# Patient Record
Sex: FEMALE | Race: BLACK OR AFRICAN AMERICAN | NOT HISPANIC OR LATINO | URBAN - METROPOLITAN AREA
[De-identification: names, ages, dates, MRNs, and addresses within clinical notes are randomized per-mention and may not be internally consistent; named-entity substitution may affect disease eponyms.]

---

## 2024-02-12 ENCOUNTER — LAB (OUTPATIENT)
Dept: URBAN - METROPOLITAN AREA CLINIC 105 | Facility: CLINIC | Age: 44
End: 2024-02-12

## 2024-02-12 ENCOUNTER — OV NP (OUTPATIENT)
Dept: URBAN - METROPOLITAN AREA CLINIC 105 | Facility: CLINIC | Age: 44
End: 2024-02-12
Payer: COMMERCIAL

## 2024-02-12 VITALS
BODY MASS INDEX: 30.78 KG/M2 | TEMPERATURE: 97.4 F | WEIGHT: 163 LBS | SYSTOLIC BLOOD PRESSURE: 119 MMHG | HEART RATE: 89 BPM | DIASTOLIC BLOOD PRESSURE: 75 MMHG | HEIGHT: 61 IN

## 2024-02-12 DIAGNOSIS — K21.9 GASTROESOPHAGEAL REFLUX DISEASE, UNSPECIFIED WHETHER ESOPHAGITIS PRESENT: ICD-10-CM

## 2024-02-12 DIAGNOSIS — K92.1 BLACK STOOLS: ICD-10-CM

## 2024-02-12 DIAGNOSIS — R11.0 NAUSEA: ICD-10-CM

## 2024-02-12 DIAGNOSIS — R10.12 LUQ PAIN: ICD-10-CM

## 2024-02-12 PROBLEM — 235595009: Status: ACTIVE | Noted: 2024-02-12

## 2024-02-12 PROCEDURE — 99204 OFFICE O/P NEW MOD 45 MIN: CPT | Performed by: INTERNAL MEDICINE

## 2024-02-12 RX ORDER — BROMOCRIPTINE MESYLATE 2.5 MG/1
TABLET ORAL
Qty: 90 TABLET | Status: ACTIVE | COMMUNITY

## 2024-02-12 RX ORDER — LOSARTAN POTASSIUM AND HYDROCHLOROTHIAZIDE 50; 12.5 MG/1; MG/1
TABLET, FILM COATED ORAL
Qty: 90 TABLET | Status: ACTIVE | COMMUNITY

## 2024-02-12 RX ORDER — PANTOPRAZOLE SODIUM 40 MG/1
1 TABLET TABLET, DELAYED RELEASE ORAL ONCE A DAY
Qty: 60 TABLET | Refills: 0 | OUTPATIENT
Start: 2024-02-12

## 2024-02-12 RX ORDER — NYSTATIN 100000 [USP'U]/ML
SUSPENSION ORAL
Qty: 120 MILLILITER | Status: ACTIVE | COMMUNITY

## 2024-02-12 RX ORDER — DICYCLOMINE HYDROCHLORIDE 20 MG/1
TABLET ORAL
Qty: 20 TABLET | Status: ACTIVE | COMMUNITY

## 2024-02-12 RX ORDER — IBUPROFEN 600 MG/1
TABLET ORAL
Qty: 21 EACH | Refills: 0 | Status: ACTIVE | COMMUNITY

## 2024-02-12 RX ORDER — CIPROFLOXACIN HYDROCHLORIDE 500 MG/1
TABLET, FILM COATED ORAL
Qty: 14 TABLET | Status: ACTIVE | COMMUNITY

## 2024-02-12 RX ORDER — CEFDINIR 300 MG/1
CAPSULE ORAL
Qty: 20 EACH | Refills: 0 | Status: ACTIVE | COMMUNITY

## 2024-02-12 RX ORDER — NYSTATIN 100000 [USP'U]/ML
SUSPENSION ORAL
Qty: 120 MILLILITER | Refills: 0 | Status: ACTIVE | COMMUNITY

## 2024-02-12 RX ORDER — FLUCONAZOLE 150 MG/1
TABLET ORAL
Qty: 1 TABLET | Status: ACTIVE | COMMUNITY

## 2024-02-12 RX ORDER — SEMAGLUTIDE 0.68 MG/ML
INJECTION, SOLUTION SUBCUTANEOUS
Qty: 6 MILLILITER | Status: ACTIVE | COMMUNITY

## 2024-02-12 RX ORDER — ONDANSETRON 4 MG/1
DISSOLVE 1 TABLET ON THE TONGUE THREE TIMES DAILY AS NEEDED FOR NAUSEA TABLET, ORALLY DISINTEGRATING ORAL
Qty: 15 EACH | Refills: 0 | Status: ACTIVE | COMMUNITY

## 2024-02-12 RX ORDER — SULFAMETHOXAZOLE AND TRIMETHOPRIM 800; 160 MG/1; MG/1
TAKE 1 TABLET BY MOUTH TWICE DAILY TABLET ORAL
Qty: 14 EACH | Refills: 0 | Status: ACTIVE | COMMUNITY

## 2024-02-12 RX ORDER — VALACYCLOVIR HYDROCHLORIDE 500 MG/1
TABLET, FILM COATED ORAL
Qty: 90 TABLET | Status: ACTIVE | COMMUNITY

## 2024-02-12 RX ORDER — SULFAMETHOXAZOLE AND TRIMETHOPRIM 800; 160 MG/1; MG/1
TABLET ORAL
Qty: 14 TABLET | Status: ACTIVE | COMMUNITY

## 2024-02-12 RX ORDER — FLASH GLUCOSE SENSOR
KIT MISCELLANEOUS
Qty: 6 EACH | Status: ACTIVE | COMMUNITY

## 2024-02-12 RX ORDER — FLUCONAZOLE 150 MG/1
TAKE 1 TABLET BY MOUTH TODAY. REPEAT IN 72 HOURS TABLET ORAL
Qty: 2 EACH | Refills: 0 | Status: ACTIVE | COMMUNITY

## 2024-02-12 RX ORDER — METFORMIN HYDROCHLORIDE 500 MG/1
TABLET, FILM COATED ORAL
Qty: 180 TABLET | Status: ACTIVE | COMMUNITY

## 2024-02-12 NOTE — PHYSICAL EXAM GASTROINTESTINAL
Abdomen , soft, epigastric and LUQ tenderness, nondistended , no guarding or rigidity , no masses palpable , normal bowel sounds , Liver and Spleen,  no hepatosplenomegaly , liver nontender

## 2024-02-12 NOTE — HPI-TODAY'S VISIT:
New patient 43-year-old female with past medical history of diabetes, Hodgkin's lymphoma S/P chemo/radiation therapy, and HTN here as a post-hospital follow up.  Patient went to [place] ED 02/08/2024 for complaints of diffuse, abdominal pain, left flank pain, nausea, increased urinary frequency, and poor appetite for the past few days. Prior to that, she was seen at an urgent care and was told she had oral thrush and abdominal spasm, she was prescribed medication with no improvement. CMP, 02/08/2024 notable for potassium 3.4, creatinine 1.45 anion gap 13 and GFR 44. CBC 02/08/2024, notable for WBC 3.9, MCV 78.3. UA was positive for UTI.  CT 02/08/2024: Transverse and descending colon decompressed with somewhat shaggy wall thickening. Correlate with G.I. symptoms. Urinary bladder thickening, maybe due to decompression. Correlate with UA for cystitis. Patient was discharged with Omnicef 300 mg BID x 10 days And ibuprofen 600 mg Q6 hours X7 days. Today, patient reports loose, black bowels. No diarrhea. She also reports fecal urgency since last week. Usually she goes every other day with complete evacuation. Denies Pepto Bismol and oral iron use. Denies rectal bleeding. Denies family hx of colon cancer and IBD. She also reports epigastric and LUQ pain that is sharp and intermittent. Admits to nausea, HB and regurgitation. Denies long-term NSAID use. No known food triggers, but she reports a low tolerance to dairy. Denies vomiting. Having SOB and fatigue. No known hx of anemia but reports needing a blood transfusion in 2003 during her chemotherapy.

## 2024-02-19 ENCOUNTER — LAB (OUTPATIENT)
Dept: URBAN - METROPOLITAN AREA CLINIC 4 | Facility: CLINIC | Age: 44
End: 2024-02-19
Payer: COMMERCIAL

## 2024-02-19 ENCOUNTER — COL/EGD (OUTPATIENT)
Dept: URBAN - METROPOLITAN AREA SURGERY CENTER 16 | Facility: SURGERY CENTER | Age: 44
End: 2024-02-19
Payer: COMMERCIAL

## 2024-02-19 DIAGNOSIS — R93.3 ABN FINDINGS-GI TRACT: ICD-10-CM

## 2024-02-19 DIAGNOSIS — K63.89 OTHER SPECIFIED DISEASES OF INTESTINE: ICD-10-CM

## 2024-02-19 DIAGNOSIS — K31.89 OTHER DISEASES OF STOMACH AND DUODENUM: ICD-10-CM

## 2024-02-19 DIAGNOSIS — K21.9 ACID REFLUX: ICD-10-CM

## 2024-02-19 DIAGNOSIS — R10.13 ABDOMINAL DISCOMFORT, EPIGASTRIC: ICD-10-CM

## 2024-02-19 DIAGNOSIS — R11.0 AM NAUSEA: ICD-10-CM

## 2024-02-19 PROCEDURE — 88342 IMHCHEM/IMCYTCHM 1ST ANTB: CPT | Performed by: PATHOLOGY

## 2024-02-19 PROCEDURE — 88305 TISSUE EXAM BY PATHOLOGIST: CPT | Performed by: PATHOLOGY

## 2024-02-19 PROCEDURE — 88313 SPECIAL STAINS GROUP 2: CPT | Performed by: PATHOLOGY

## 2024-02-19 PROCEDURE — 88312 SPECIAL STAINS GROUP 1: CPT | Performed by: PATHOLOGY

## 2024-02-19 PROCEDURE — 45380 COLONOSCOPY AND BIOPSY: CPT | Performed by: INTERNAL MEDICINE

## 2024-02-19 PROCEDURE — 43239 EGD BIOPSY SINGLE/MULTIPLE: CPT | Performed by: INTERNAL MEDICINE

## 2024-02-19 RX ORDER — CIPROFLOXACIN HYDROCHLORIDE 500 MG/1
TABLET, FILM COATED ORAL
Qty: 14 TABLET | Status: ACTIVE | COMMUNITY

## 2024-02-19 RX ORDER — SULFAMETHOXAZOLE AND TRIMETHOPRIM 800; 160 MG/1; MG/1
TAKE 1 TABLET BY MOUTH TWICE DAILY TABLET ORAL
Qty: 14 EACH | Refills: 0 | Status: ACTIVE | COMMUNITY

## 2024-02-19 RX ORDER — NYSTATIN 100000 [USP'U]/ML
SUSPENSION ORAL
Qty: 120 MILLILITER | Refills: 0 | Status: ACTIVE | COMMUNITY

## 2024-02-19 RX ORDER — ONDANSETRON 4 MG/1
DISSOLVE 1 TABLET ON THE TONGUE THREE TIMES DAILY AS NEEDED FOR NAUSEA TABLET, ORALLY DISINTEGRATING ORAL
Qty: 15 EACH | Refills: 0 | Status: ACTIVE | COMMUNITY

## 2024-02-19 RX ORDER — SULFAMETHOXAZOLE AND TRIMETHOPRIM 800; 160 MG/1; MG/1
TABLET ORAL
Qty: 14 TABLET | Status: ACTIVE | COMMUNITY

## 2024-02-19 RX ORDER — VALACYCLOVIR HYDROCHLORIDE 500 MG/1
TABLET, FILM COATED ORAL
Qty: 90 TABLET | Status: ACTIVE | COMMUNITY

## 2024-02-19 RX ORDER — METFORMIN HYDROCHLORIDE 500 MG/1
TABLET, FILM COATED ORAL
Qty: 180 TABLET | Status: ACTIVE | COMMUNITY

## 2024-02-19 RX ORDER — FLASH GLUCOSE SENSOR
KIT MISCELLANEOUS
Qty: 6 EACH | Status: ACTIVE | COMMUNITY

## 2024-02-19 RX ORDER — CEFDINIR 300 MG/1
CAPSULE ORAL
Qty: 20 EACH | Refills: 0 | Status: ACTIVE | COMMUNITY

## 2024-02-19 RX ORDER — FLUCONAZOLE 150 MG/1
TAKE 1 TABLET BY MOUTH TODAY. REPEAT IN 72 HOURS TABLET ORAL
Qty: 2 EACH | Refills: 0 | Status: ACTIVE | COMMUNITY

## 2024-02-19 RX ORDER — IBUPROFEN 600 MG/1
TABLET ORAL
Qty: 21 EACH | Refills: 0 | Status: ACTIVE | COMMUNITY

## 2024-02-19 RX ORDER — DICYCLOMINE HYDROCHLORIDE 20 MG/1
TABLET ORAL
Qty: 20 TABLET | Status: ACTIVE | COMMUNITY

## 2024-02-19 RX ORDER — BROMOCRIPTINE MESYLATE 2.5 MG/1
TABLET ORAL
Qty: 90 TABLET | Status: ACTIVE | COMMUNITY

## 2024-02-19 RX ORDER — LOSARTAN POTASSIUM AND HYDROCHLOROTHIAZIDE 50; 12.5 MG/1; MG/1
TABLET, FILM COATED ORAL
Qty: 90 TABLET | Status: ACTIVE | COMMUNITY

## 2024-02-19 RX ORDER — SEMAGLUTIDE 0.68 MG/ML
INJECTION, SOLUTION SUBCUTANEOUS
Qty: 6 MILLILITER | Status: ACTIVE | COMMUNITY

## 2024-02-19 RX ORDER — PANTOPRAZOLE SODIUM 40 MG/1
TAKE 1 TABLET BY MOUTH EVERY DAY TABLET, DELAYED RELEASE ORAL
Qty: 90 TABLET | Refills: 0 | Status: ACTIVE | COMMUNITY

## 2024-02-19 RX ORDER — NYSTATIN 100000 [USP'U]/ML
SUSPENSION ORAL
Qty: 120 MILLILITER | Status: ACTIVE | COMMUNITY

## 2024-02-19 RX ORDER — FLUCONAZOLE 150 MG/1
TABLET ORAL
Qty: 1 TABLET | Status: ACTIVE | COMMUNITY

## 2024-03-04 ENCOUNTER — OV EP (OUTPATIENT)
Dept: URBAN - METROPOLITAN AREA CLINIC 105 | Facility: CLINIC | Age: 44
End: 2024-03-04
Payer: COMMERCIAL

## 2024-03-04 VITALS
SYSTOLIC BLOOD PRESSURE: 108 MMHG | HEART RATE: 73 BPM | HEIGHT: 61 IN | WEIGHT: 159.2 LBS | DIASTOLIC BLOOD PRESSURE: 75 MMHG | TEMPERATURE: 97.3 F | BODY MASS INDEX: 30.06 KG/M2

## 2024-03-04 DIAGNOSIS — K92.1 BLACK STOOLS: ICD-10-CM

## 2024-03-04 DIAGNOSIS — R10.12 LUQ PAIN: ICD-10-CM

## 2024-03-04 DIAGNOSIS — R71.8 LOW MEAN CORPUSCULAR VOLUME (MCV): ICD-10-CM

## 2024-03-04 DIAGNOSIS — R11.0 NAUSEA: ICD-10-CM

## 2024-03-04 PROBLEM — 197125005: Status: ACTIVE | Noted: 2024-03-04

## 2024-03-04 PROBLEM — 266435005: Status: ACTIVE | Noted: 2024-03-04

## 2024-03-04 PROCEDURE — 99213 OFFICE O/P EST LOW 20 MIN: CPT | Performed by: INTERNAL MEDICINE

## 2024-03-04 RX ORDER — AMITRIPTYLINE HYDROCHLORIDE 10 MG/1
1 TABLET AT BEDTIME TABLET, FILM COATED ORAL ONCE A DAY
Qty: 90 TABLET | Refills: 0 | OUTPATIENT
Start: 2024-03-04

## 2024-03-04 RX ORDER — FLASH GLUCOSE SENSOR
KIT MISCELLANEOUS
Qty: 6 EACH | Status: ACTIVE | COMMUNITY

## 2024-03-04 RX ORDER — BROMOCRIPTINE MESYLATE 2.5 MG/1
TABLET ORAL
Qty: 90 TABLET | Status: ACTIVE | COMMUNITY

## 2024-03-04 RX ORDER — VALACYCLOVIR HYDROCHLORIDE 500 MG/1
TABLET, FILM COATED ORAL
Qty: 90 TABLET | Status: ACTIVE | COMMUNITY

## 2024-03-04 RX ORDER — LOSARTAN POTASSIUM AND HYDROCHLOROTHIAZIDE 50; 12.5 MG/1; MG/1
TABLET, FILM COATED ORAL
Qty: 90 TABLET | Status: ACTIVE | COMMUNITY

## 2024-03-04 RX ORDER — PANTOPRAZOLE SODIUM 40 MG/1
TAKE 1 TABLET BY MOUTH EVERY DAY TABLET, DELAYED RELEASE ORAL
Qty: 90 TABLET | Refills: 0 | Status: ACTIVE | COMMUNITY

## 2024-03-04 RX ORDER — PANTOPRAZOLE SODIUM 40 MG/1
1 TABLET IN THE MORNING ON AN EMPTY STOMACH TABLET, DELAYED RELEASE ORAL ONCE A DAY
Qty: 90 TABLET | Refills: 0 | OUTPATIENT

## 2024-03-04 NOTE — HPI-TODAY'S VISIT:
On 02/12/2024, New patient 43-year-old female with past medical history of diabetes, Hodgkin's lymphoma S/P chemo/radiation therapy, and HTN here as a post-hospital follow up.  Patient went to [place] [facility] 02/08/2024 for complaints of diffuse, abdominal pain, left flank pain, nausea, increased urinary frequency, and poor appetite for the past few days. Prior to that, she was seen at an urgent care and was told she had oral thrush and abdominal spasm, she was prescribed medication with no improvement. CMP, 02/08/2024 notable for potassium 3.4, creatinine 1.45 anion gap 13 and GFR 44. CBC 02/08/2024, notable for WBC 3.9, MCV 78.3. UA was positive for UTI.  CT 02/08/2024: Transverse and descending colon decompressed with somewhat shaggy wall thickening. Correlate with G.I. symptoms. Urinary bladder thickening, maybe due to decompression. Correlate with UA for cystitis. Patient was discharged with Omnicef 300 mg BID x 10 days And ibuprofen 600 mg Q6 hours X7 days. Today, patient reports loose, black bowels. No diarrhea. She also reports fecal urgency since last week. Usually she goes every other day with complete evacuation. Denies Pepto Bismol and oral iron use. Denies rectal bleeding. Denies family hx of colon cancer and IBD. She also reports epigastric and LUQ pain that is sharp and intermittent. Admits to nausea, HB and regurgitation. Denies long-term NSAID use. No known food triggers, but she reports a low tolerance to dairy. Denies vomiting. Having SOB and fatigue. No known hx of anemia but reports needing a blood transfusion in 2003 during her chemotherapy.  Today, pt is here for a follow up. She is s/p EGD/Colon. Procedure and results discussed. All questions answered. She states she has been experiencing gurgling in her stomach since Friday. She is also experiencing some abdominal bloating and abdominal cramping. She is taking Pantoprazole 40 mg at night. She is having BMs every other day. She denies incomplete evacuation. She states it is looser. Denies melena. She has seen urologist. And was told her bladder was normal. Her labs were not resulted to me yet, she states she had them done at [facility]. A medical records request was sent and they sent it back stated they do not have record of the patient visiting the facility.

## 2024-03-25 ENCOUNTER — OV EP (OUTPATIENT)
Dept: URBAN - METROPOLITAN AREA CLINIC 105 | Facility: CLINIC | Age: 44
End: 2024-03-25

## 2024-06-12 ENCOUNTER — ERX REFILL RESPONSE (OUTPATIENT)
Dept: URBAN - METROPOLITAN AREA CLINIC 105 | Facility: CLINIC | Age: 44
End: 2024-06-12

## 2024-06-12 RX ORDER — PANTOPRAZOLE SODIUM 40 MG/1
1 TABLET IN THE MORNING ON AN EMPTY STOMACH TABLET, DELAYED RELEASE ORAL ONCE A DAY
Qty: 90 TABLET | Refills: 0 | OUTPATIENT

## 2024-06-12 RX ORDER — PANTOPRAZOLE 40 MG/1
TAKE 1 TABLET EVERY MORNINGON AN EMPTY STOMACH TABLET, DELAYED RELEASE ORAL
Qty: 90 TABLET | Refills: 0 | OUTPATIENT

## 2024-06-14 ENCOUNTER — OFFICE VISIT (OUTPATIENT)
Dept: URBAN - METROPOLITAN AREA CLINIC 105 | Facility: CLINIC | Age: 44
End: 2024-06-14

## 2024-07-02 ENCOUNTER — OFFICE VISIT (OUTPATIENT)
Dept: URBAN - NONMETROPOLITAN AREA SURGERY CENTER 1 | Facility: SURGERY CENTER | Age: 44
End: 2024-07-02

## 2024-07-12 ENCOUNTER — DASHBOARD ENCOUNTERS (OUTPATIENT)
Age: 44
End: 2024-07-12

## 2024-07-12 ENCOUNTER — OFFICE VISIT (OUTPATIENT)
Dept: URBAN - METROPOLITAN AREA CLINIC 105 | Facility: CLINIC | Age: 44
End: 2024-07-12
Payer: COMMERCIAL

## 2024-07-12 ENCOUNTER — LAB OUTSIDE AN ENCOUNTER (OUTPATIENT)
Dept: URBAN - METROPOLITAN AREA CLINIC 105 | Facility: CLINIC | Age: 44
End: 2024-07-12

## 2024-07-12 VITALS
WEIGHT: 158 LBS | DIASTOLIC BLOOD PRESSURE: 92 MMHG | HEIGHT: 61 IN | SYSTOLIC BLOOD PRESSURE: 147 MMHG | TEMPERATURE: 97.2 F | BODY MASS INDEX: 29.83 KG/M2 | HEART RATE: 92 BPM

## 2024-07-12 DIAGNOSIS — R15.2 FECAL URGENCY: ICD-10-CM

## 2024-07-12 DIAGNOSIS — R10.12 LUQ PAIN: ICD-10-CM

## 2024-07-12 DIAGNOSIS — R11.0 NAUSEA: ICD-10-CM

## 2024-07-12 DIAGNOSIS — R06.02 SOB (SHORTNESS OF BREATH): ICD-10-CM

## 2024-07-12 DIAGNOSIS — K21.9 GASTROESOPHAGEAL REFLUX DISEASE WITHOUT ESOPHAGITIS: ICD-10-CM

## 2024-07-12 DIAGNOSIS — R53.83 FATIGUE, UNSPECIFIED TYPE: ICD-10-CM

## 2024-07-12 DIAGNOSIS — R10.13 EPIGASTRIC ABDOMINAL PAIN: ICD-10-CM

## 2024-07-12 DIAGNOSIS — R71.8 LOW MEAN CORPUSCULAR VOLUME (MCV): ICD-10-CM

## 2024-07-12 DIAGNOSIS — R93.5 ABNORMAL CT OF THE ABDOMEN: ICD-10-CM

## 2024-07-12 DIAGNOSIS — K92.1 BLACK STOOLS: ICD-10-CM

## 2024-07-12 DIAGNOSIS — K58.0 IRRITABLE BOWEL SYNDROME WITH DIARRHEA: ICD-10-CM

## 2024-07-12 PROCEDURE — 99214 OFFICE O/P EST MOD 30 MIN: CPT | Performed by: INTERNAL MEDICINE

## 2024-07-12 RX ORDER — BROMOCRIPTINE MESYLATE 2.5 MG/1
TABLET ORAL
Qty: 90 TABLET | Status: ACTIVE | COMMUNITY

## 2024-07-12 RX ORDER — LOSARTAN POTASSIUM AND HYDROCHLOROTHIAZIDE 50; 12.5 MG/1; MG/1
TABLET, FILM COATED ORAL
Qty: 90 TABLET | Status: ACTIVE | COMMUNITY

## 2024-07-12 RX ORDER — AMITRIPTYLINE HYDROCHLORIDE 10 MG/1
1 TABLET AT BEDTIME TABLET, FILM COATED ORAL ONCE A DAY
Qty: 90 TABLET | Refills: 0 | Status: ACTIVE | COMMUNITY
Start: 2024-03-04

## 2024-07-12 RX ORDER — PANTOPRAZOLE SODIUM 40 MG/1
1 TABLET IN THE MORNING ON AN EMPTY STOMACH TABLET, DELAYED RELEASE ORAL ONCE A DAY
Qty: 90 TABLET | Refills: 0 | OUTPATIENT

## 2024-07-12 RX ORDER — VALACYCLOVIR HYDROCHLORIDE 500 MG/1
TABLET, FILM COATED ORAL
Qty: 90 TABLET | Status: ACTIVE | COMMUNITY

## 2024-07-12 RX ORDER — FLASH GLUCOSE SENSOR
KIT MISCELLANEOUS
Qty: 6 EACH | Status: ACTIVE | COMMUNITY

## 2024-07-12 RX ORDER — PANTOPRAZOLE 40 MG/1
TAKE 1 TABLET EVERY MORNINGON AN EMPTY STOMACH TABLET, DELAYED RELEASE ORAL
Qty: 90 TABLET | Refills: 0 | Status: ACTIVE | COMMUNITY

## 2024-07-12 NOTE — HPI-TODAY'S VISIT:
On 02/12/2024, New patient 43-year-old female with past medical history of diabetes, Hodgkin's lymphoma S/P chemo/radiation therapy, and HTN here as a post-hospital follow up.  Patient went to [place] ED 02/08/2024 for complaints of diffuse, abdominal pain, left flank pain, nausea, increased urinary frequency, and poor appetite for the past few days. Prior to that, she was seen at an urgent care and was told she had oral thrush and abdominal spasm, she was prescribed medication with no improvement. CMP, 02/08/2024 notable for potassium 3.4, creatinine 1.45 anion gap 13 and GFR 44. CBC 02/08/2024, notable for WBC 3.9, MCV 78.3. UA was positive for UTI.  CT 02/08/2024: Transverse and descending colon decompressed with somewhat shaggy wall thickening. Correlate with G.I. symptoms. Urinary bladder thickening, maybe due to decompression. Correlate with UA for cystitis. Patient was discharged with Omnicef 300 mg BID x 10 days And ibuprofen 600 mg Q6 hours X7 days. Today, patient reports loose, black bowels. No diarrhea. She also reports fecal urgency since last week. Usually she goes every other day with complete evacuation. Denies Pepto Bismol and oral iron use. Denies rectal bleeding. Denies family hx of colon cancer and IBD. She also reports epigastric and LUQ pain that is sharp and intermittent. Admits to nausea, HB and regurgitation. Denies long-term NSAID use. No known food triggers, but she reports a low tolerance to dairy. Denies vomiting. Having SOB and fatigue. No known hx of anemia but reports needing a blood transfusion in 2003 during her chemotherapy.  Today, pt is here for a follow up. She is s/p EGD/Colon. Procedure and results discussed. All questions answered. She states she has been experiencing gurgling in her stomach since Friday. She is also experiencing some abdominal bloating and abdominal cramping. She is taking Pantoprazole 40 mg at night. She is having BMs every other day. She denies incomplete evacuation. She states it is looser. Denies melena. She has seen urologist. And was told her bladder was normal. Her labs were not resulted to me yet, she states she had them done at [facility]. A medical records request was sent and they sent it back stated they do not have record of the patient visiting the facility.  7/12/24 Pt of Dr Mckeon/Denisse Seeing me due to scheduling issues Reviewed EGD and colon report from 2/2024 unremarkable except for reflux on esophagus bx.  Has been having stomach discomfort and has to run to the bathroom 'that is why they did the colonoscopy and EGD. they said they didnt see anything" Took Elavil for 90 days and says pain was better but still having episodes of urgency and having to run to the  bathroom. 3-4 times a day on a daily basis. now having nocturnal stools 1-2 ice in a week.  Stools are ALWAYS loose. and sometimes watery.  Taking Pantoprazole for reflux which helps.

## 2024-07-18 ENCOUNTER — LAB OUTSIDE AN ENCOUNTER (OUTPATIENT)
Dept: URBAN - METROPOLITAN AREA CLINIC 105 | Facility: CLINIC | Age: 44
End: 2024-07-18

## 2024-07-23 LAB
ADENOVIRUS F 40/41: NOT DETECTED
CAMPYLOBACTER: NOT DETECTED
CLOSTRIDIUM DIFFICILE: NOT DETECTED
ENTAMOEBA HISTOLYTICA: NOT DETECTED
ENTEROAGGREGATIVE E.COLI: NOT DETECTED
ENTEROTOXIGENIC E.COLI: NOT DETECTED
ESCHERICHIA COLI O157: NOT DETECTED
GIARDIA LAMBLIA: NOT DETECTED
NOROVIRUS GI/GII: NOT DETECTED
ROTAVIRUS A: NOT DETECTED
SALMONELLA SPP.: NOT DETECTED
SHIGA-LIKE TOXIN PRODUCING E.COLI: NOT DETECTED
SHIGELLA SPP. / ENTEROINVASIVE E.COLI: NOT DETECTED
VIBRIO PARAHAEMOLYTICUS: NOT DETECTED
VIBRIO SPP.: NOT DETECTED
YERSINIA ENTEROCOLITICA: NOT DETECTED

## 2024-09-13 ENCOUNTER — OFFICE VISIT (OUTPATIENT)
Dept: URBAN - METROPOLITAN AREA CLINIC 105 | Facility: CLINIC | Age: 44
End: 2024-09-13

## 2024-10-04 ENCOUNTER — OFFICE VISIT (OUTPATIENT)
Dept: URBAN - METROPOLITAN AREA CLINIC 105 | Facility: CLINIC | Age: 44
End: 2024-10-04
Payer: COMMERCIAL

## 2024-10-04 ENCOUNTER — OFFICE VISIT (OUTPATIENT)
Dept: URBAN - METROPOLITAN AREA CLINIC 105 | Facility: CLINIC | Age: 44
End: 2024-10-04

## 2024-10-04 VITALS
WEIGHT: 167 LBS | TEMPERATURE: 97.7 F | DIASTOLIC BLOOD PRESSURE: 78 MMHG | HEIGHT: 61 IN | BODY MASS INDEX: 31.53 KG/M2 | HEART RATE: 68 BPM | SYSTOLIC BLOOD PRESSURE: 119 MMHG

## 2024-10-04 DIAGNOSIS — R10.12 LUQ PAIN: ICD-10-CM

## 2024-10-04 DIAGNOSIS — K92.1 BLACK STOOLS: ICD-10-CM

## 2024-10-04 DIAGNOSIS — K21.9 GASTROESOPHAGEAL REFLUX DISEASE WITHOUT ESOPHAGITIS: ICD-10-CM

## 2024-10-04 DIAGNOSIS — R11.0 NAUSEA: ICD-10-CM

## 2024-10-04 PROCEDURE — 99214 OFFICE O/P EST MOD 30 MIN: CPT | Performed by: INTERNAL MEDICINE

## 2024-10-04 RX ORDER — ESTRADIOL AND NORETHINDRONE ACETATE .5; .1 MG/1; MG/1
1 TABLET TABLET ORAL ONCE A DAY
Status: ACTIVE | COMMUNITY

## 2024-10-04 RX ORDER — PANTOPRAZOLE SODIUM 40 MG/1
1 TABLET IN THE MORNING ON AN EMPTY STOMACH TABLET, DELAYED RELEASE ORAL ONCE A DAY
Qty: 90 TABLET | Refills: 0 | Status: ACTIVE | COMMUNITY

## 2024-10-04 RX ORDER — FLASH GLUCOSE SENSOR
KIT MISCELLANEOUS
Qty: 6 EACH | Status: ACTIVE | COMMUNITY

## 2024-10-04 RX ORDER — AMITRIPTYLINE HYDROCHLORIDE 10 MG/1
1 TABLET AT BEDTIME TABLET, FILM COATED ORAL ONCE A DAY
Qty: 90 TABLET | Refills: 0 | Status: ACTIVE | COMMUNITY
Start: 2024-03-04

## 2024-10-04 RX ORDER — LOSARTAN POTASSIUM AND HYDROCHLOROTHIAZIDE 50; 12.5 MG/1; MG/1
TABLET, FILM COATED ORAL
Qty: 90 TABLET | Status: ACTIVE | COMMUNITY

## 2024-10-04 RX ORDER — PANTOPRAZOLE SODIUM 40 MG/1
1 TABLET IN THE MORNING ON AN EMPTY STOMACH TABLET, DELAYED RELEASE ORAL ONCE A DAY
Qty: 90 TABLET | Refills: 0 | OUTPATIENT

## 2024-10-04 RX ORDER — LINACLOTIDE 145 UG/1
1 CAPSULE AT LEAST 30 MINUTES BEFORE THE FIRST MEAL OF THE DAY ON AN EMPTY STOMACH CAPSULE, GELATIN COATED ORAL ONCE A DAY
Qty: 90 | Refills: 0 | OUTPATIENT
Start: 2024-10-04 | End: 2025-01-01

## 2024-10-04 RX ORDER — VALACYCLOVIR HYDROCHLORIDE 500 MG/1
TABLET, FILM COATED ORAL
Qty: 90 TABLET | Status: ACTIVE | COMMUNITY

## 2024-10-04 RX ORDER — PANTOPRAZOLE 40 MG/1
TAKE 1 TABLET EVERY MORNINGON AN EMPTY STOMACH TABLET, DELAYED RELEASE ORAL
Qty: 90 TABLET | Refills: 0 | Status: ACTIVE | COMMUNITY

## 2024-10-04 RX ORDER — BROMOCRIPTINE MESYLATE 2.5 MG/1
TABLET ORAL
Qty: 90 TABLET | Status: ACTIVE | COMMUNITY

## 2024-10-04 NOTE — HPI-TODAY'S VISIT:
On 02/12/2024, New patient 43-year-old female with past medical history of diabetes, Hodgkin's lymphoma S/P chemo/radiation therapy, and HTN here as a post-hospital follow up.  Patient went to [place] ED 02/08/2024 for complaints of diffuse, abdominal pain, left flank pain, nausea, increased urinary frequency, and poor appetite for the past few days. Prior to that, she was seen at an urgent care and was told she had oral thrush and abdominal spasm, she was prescribed medication with no improvement. CMP, 02/08/2024 notable for potassium 3.4, creatinine 1.45 anion gap 13 and GFR 44. CBC 02/08/2024, notable for WBC 3.9, MCV 78.3. UA was positive for UTI.  CT 02/08/2024: Transverse and descending colon decompressed with somewhat shaggy wall thickening. Correlate with G.I. symptoms. Urinary bladder thickening, maybe due to decompression. Correlate with UA for cystitis. Patient was discharged with Omnicef 300 mg BID x 10 days And ibuprofen 600 mg Q6 hours X7 days. Today, patient reports loose, black bowels. No diarrhea. She also reports fecal urgency since last week. Usually she goes every other day with complete evacuation. Denies Pepto Bismol and oral iron use. Denies rectal bleeding. Denies family hx of colon cancer and IBD. She also reports epigastric and LUQ pain that is sharp and intermittent. Admits to nausea, HB and regurgitation. Denies long-term NSAID use. No known food triggers, but she reports a low tolerance to dairy. Denies vomiting. Having SOB and fatigue. No known hx of anemia but reports needing a blood transfusion in 2003 during her chemotherapy.  Today, pt is here for a follow up. She is s/p EGD/Colon. Procedure and results discussed. All questions answered. She states she has been experiencing gurgling in her stomach since Friday. She is also experiencing some abdominal bloating and abdominal cramping. She is taking Pantoprazole 40 mg at night. She is having BMs every other day. She denies incomplete evacuation. She states it is looser. Denies melena. She has seen urologist. And was told her bladder was normal. Her labs were not resulted to me yet, she states she had them done at [facility]. A medical records request was sent and they sent it back stated they do not have record of the patient visiting the facility.  7/12/24 Pt of Dr Mckeon/Denisse Seeing me due to scheduling issues Reviewed EGD and colon report from 2/2024 unremarkable except for reflux on esophagus bx.  Has been having stomach discomfort and has to run to the bathroom 'that is why they did the colonoscopy and EGD. they said they didnt see anything" Took Elavil for 90 days and says pain was better but still having episodes of urgency and having to run to the  bathroom. 3-4 times a day on a daily basis. now having nocturnal stools 1-2 ice in a week.  Stools are ALWAYS loose. and sometimes watery.  Taking Pantoprazole for reflux which helps.  10/4/24 Wants to switch MDs to me.  Says she will go many days w/o a BM and then the next 3 days "I cant stay off the toilet" SAys yesterday went to [place] and went to the bathroom 5 times afterwards.

## 2024-10-14 ENCOUNTER — WEB ENCOUNTER (OUTPATIENT)
Dept: URBAN - METROPOLITAN AREA CLINIC 105 | Facility: CLINIC | Age: 44
End: 2024-10-14

## 2024-10-14 RX ORDER — PANTOPRAZOLE SODIUM 40 MG/1
1 TABLET IN THE MORNING ON AN EMPTY STOMACH TABLET, DELAYED RELEASE ORAL ONCE A DAY
Qty: 90 | Refills: 3

## 2024-11-01 ENCOUNTER — OFFICE VISIT (OUTPATIENT)
Dept: URBAN - METROPOLITAN AREA CLINIC 105 | Facility: CLINIC | Age: 44
End: 2024-11-01

## 2025-01-03 ENCOUNTER — OFFICE VISIT (OUTPATIENT)
Dept: URBAN - METROPOLITAN AREA CLINIC 105 | Facility: CLINIC | Age: 45
End: 2025-01-03
Payer: COMMERCIAL

## 2025-01-03 VITALS
TEMPERATURE: 97.2 F | DIASTOLIC BLOOD PRESSURE: 90 MMHG | SYSTOLIC BLOOD PRESSURE: 136 MMHG | BODY MASS INDEX: 31.15 KG/M2 | HEART RATE: 103 BPM | WEIGHT: 165 LBS | HEIGHT: 61 IN

## 2025-01-03 DIAGNOSIS — R15.2 FECAL URGENCY: ICD-10-CM

## 2025-01-03 DIAGNOSIS — R06.02 SOB (SHORTNESS OF BREATH): ICD-10-CM

## 2025-01-03 DIAGNOSIS — R71.8 LOW MEAN CORPUSCULAR VOLUME (MCV): ICD-10-CM

## 2025-01-03 DIAGNOSIS — K59.09 CHRONIC CONSTIPATION WITH OVERFLOW: ICD-10-CM

## 2025-01-03 DIAGNOSIS — K21.9 GASTROESOPHAGEAL REFLUX DISEASE WITHOUT ESOPHAGITIS: ICD-10-CM

## 2025-01-03 DIAGNOSIS — R53.83 FATIGUE, UNSPECIFIED TYPE: ICD-10-CM

## 2025-01-03 DIAGNOSIS — K92.1 BLACK STOOLS: ICD-10-CM

## 2025-01-03 DIAGNOSIS — R10.12 LUQ PAIN: ICD-10-CM

## 2025-01-03 DIAGNOSIS — K58.0 IRRITABLE BOWEL SYNDROME WITH DIARRHEA: ICD-10-CM

## 2025-01-03 DIAGNOSIS — R11.0 NAUSEA: ICD-10-CM

## 2025-01-03 DIAGNOSIS — R93.5 ABNORMAL CT OF THE ABDOMEN: ICD-10-CM

## 2025-01-03 PROCEDURE — 99214 OFFICE O/P EST MOD 30 MIN: CPT | Performed by: INTERNAL MEDICINE

## 2025-01-03 RX ORDER — VALACYCLOVIR HYDROCHLORIDE 500 MG/1
TABLET, FILM COATED ORAL
Qty: 90 TABLET | Status: ACTIVE | COMMUNITY

## 2025-01-03 RX ORDER — LOSARTAN POTASSIUM AND HYDROCHLOROTHIAZIDE 50; 12.5 MG/1; MG/1
TAKE 1 TABLET ONCE DAILY TABLET, FILM COATED ORAL
Qty: 90 EACH | Refills: 1 | Status: ACTIVE | COMMUNITY

## 2025-01-03 RX ORDER — LINACLOTIDE 145 UG/1
CAPSULE, GELATIN COATED ORAL
Qty: 90 CAPSULE | Status: ACTIVE | COMMUNITY

## 2025-01-03 RX ORDER — PANTOPRAZOLE SODIUM 40 MG/1
1 TABLET IN THE MORNING ON AN EMPTY STOMACH TABLET, DELAYED RELEASE ORAL ONCE A DAY
Qty: 90 | Refills: 3 | Status: ACTIVE | COMMUNITY

## 2025-01-03 RX ORDER — PANTOPRAZOLE 40 MG/1
TAKE 1 TABLET EVERY MORNINGON AN EMPTY STOMACH TABLET, DELAYED RELEASE ORAL
Qty: 90 TABLET | Refills: 0 | Status: DISCONTINUED | COMMUNITY

## 2025-01-03 RX ORDER — BROMOCRIPTINE MESYLATE 2.5 MG/1
TABLET ORAL
Qty: 90 TABLET | Status: ACTIVE | COMMUNITY

## 2025-01-03 RX ORDER — PANTOPRAZOLE SODIUM 40 MG/1
1 TABLET IN THE MORNING ON AN EMPTY STOMACH TABLET, DELAYED RELEASE ORAL ONCE A DAY
Qty: 90 TABLET | Refills: 0 | OUTPATIENT

## 2025-01-03 RX ORDER — METFORMIN HYDROCHLORIDE 500 MG/1
TAKE 1 TABLET IN THE       MORNING AND 1 TABLET IN THEEVENING WITH MEALS TABLET, FILM COATED ORAL
Qty: 180 EACH | Refills: 0 | Status: ACTIVE | COMMUNITY

## 2025-01-03 RX ORDER — FLASH GLUCOSE SENSOR
KIT MISCELLANEOUS
Qty: 6 EACH | Status: ACTIVE | COMMUNITY

## 2025-01-03 RX ORDER — ESTRADIOL AND NORETHINDRONE ACETATE .5; .1 MG/1; MG/1
1 TABLET TABLET ORAL ONCE A DAY
Status: ACTIVE | COMMUNITY

## 2025-01-03 RX ORDER — AMITRIPTYLINE HYDROCHLORIDE 10 MG/1
1 TABLET AT BEDTIME TABLET, FILM COATED ORAL ONCE A DAY
Qty: 90 TABLET | Refills: 0 | Status: ACTIVE | COMMUNITY
Start: 2024-03-04

## 2025-01-03 RX ORDER — LOSARTAN POTASSIUM AND HYDROCHLOROTHIAZIDE 50; 12.5 MG/1; MG/1
TABLET, FILM COATED ORAL
Qty: 90 TABLET | Status: DISCONTINUED | COMMUNITY

## 2025-01-12 ENCOUNTER — WEB ENCOUNTER (OUTPATIENT)
Dept: URBAN - METROPOLITAN AREA CLINIC 105 | Facility: CLINIC | Age: 45
End: 2025-01-12

## 2025-01-12 RX ORDER — PANTOPRAZOLE SODIUM 40 MG/1
1 TABLET IN THE MORNING ON AN EMPTY STOMACH TABLET, DELAYED RELEASE ORAL ONCE A DAY
Qty: 90 | Refills: 0

## 2025-01-13 ENCOUNTER — ERX REFILL RESPONSE (OUTPATIENT)
Dept: URBAN - METROPOLITAN AREA CLINIC 105 | Facility: CLINIC | Age: 45
End: 2025-01-13

## 2025-01-13 RX ORDER — LINACLOTIDE 145 UG/1
TAKE 1 CAPSULE BY MOUTH DAILY 30 MINUTES BEFORE FIRST MEAL OF THE DAY ON AN EMPTY STOMACH CAPSULE, GELATIN COATED ORAL
Qty: 90 CAPSULE | Refills: 0 | OUTPATIENT

## 2025-01-13 RX ORDER — LINACLOTIDE 145 UG/1
CAPSULE, GELATIN COATED ORAL
Qty: 90 CAPSULE | OUTPATIENT

## 2025-03-04 ENCOUNTER — TELEPHONE ENCOUNTER (OUTPATIENT)
Dept: URBAN - METROPOLITAN AREA CLINIC 6 | Facility: CLINIC | Age: 45
End: 2025-03-04

## 2025-03-04 RX ORDER — LUBIPROSTONE 24 UG/1
1 CAPSULE WITH FOOD AND WATER CAPSULE, GELATIN COATED ORAL TWICE A DAY
Qty: 180 CAPSULE | Refills: 0 | OUTPATIENT
Start: 2025-03-04 | End: 2025-06-02

## 2025-03-31 ENCOUNTER — ERX REFILL RESPONSE (OUTPATIENT)
Dept: URBAN - METROPOLITAN AREA CLINIC 105 | Facility: CLINIC | Age: 45
End: 2025-03-31

## 2025-03-31 RX ORDER — PANTOPRAZOLE 40 MG/1
TAKE 1 TABLET EVERY MORNINGON AN EMPTY STOMACH TABLET, DELAYED RELEASE ORAL
Qty: 90 TABLET | Refills: 0 | OUTPATIENT

## 2025-03-31 RX ORDER — PANTOPRAZOLE SODIUM 40 MG/1
1 TABLET IN THE MORNING ON AN EMPTY STOMACH TABLET, DELAYED RELEASE ORAL ONCE A DAY
Qty: 90 | Refills: 0 | OUTPATIENT

## 2025-04-04 ENCOUNTER — OFFICE VISIT (OUTPATIENT)
Dept: URBAN - METROPOLITAN AREA CLINIC 105 | Facility: CLINIC | Age: 45
End: 2025-04-04

## 2025-04-18 ENCOUNTER — OFFICE VISIT (OUTPATIENT)
Dept: URBAN - METROPOLITAN AREA CLINIC 105 | Facility: CLINIC | Age: 45
End: 2025-04-18
Payer: COMMERCIAL

## 2025-04-18 DIAGNOSIS — K92.1 BLACK STOOLS: ICD-10-CM

## 2025-04-18 DIAGNOSIS — K58.0 IRRITABLE BOWEL SYNDROME WITH DIARRHEA: ICD-10-CM

## 2025-04-18 DIAGNOSIS — R10.12 LUQ PAIN: ICD-10-CM

## 2025-04-18 DIAGNOSIS — K59.09 CHRONIC CONSTIPATION WITH OVERFLOW: ICD-10-CM

## 2025-04-18 DIAGNOSIS — K21.9 GASTROESOPHAGEAL REFLUX DISEASE WITHOUT ESOPHAGITIS: ICD-10-CM

## 2025-04-18 DIAGNOSIS — R10.13 EPIGASTRIC ABDOMINAL PAIN: ICD-10-CM

## 2025-04-18 DIAGNOSIS — R15.2 FECAL URGENCY: ICD-10-CM

## 2025-04-18 DIAGNOSIS — R11.0 NAUSEA: ICD-10-CM

## 2025-04-18 PROCEDURE — 99213 OFFICE O/P EST LOW 20 MIN: CPT | Performed by: INTERNAL MEDICINE

## 2025-04-18 RX ORDER — LUBIPROSTONE 24 UG/1
1 CAPSULE WITH FOOD AND WATER CAPSULE, GELATIN COATED ORAL TWICE A DAY
Qty: 180 CAPSULE | Refills: 0 | Status: ACTIVE | COMMUNITY
Start: 2025-03-04 | End: 2025-06-02

## 2025-04-18 RX ORDER — BROMOCRIPTINE MESYLATE 2.5 MG/1
TABLET ORAL
Qty: 90 TABLET | Status: ACTIVE | COMMUNITY

## 2025-04-18 RX ORDER — FLASH GLUCOSE SENSOR
KIT MISCELLANEOUS
Qty: 6 EACH | Status: ACTIVE | COMMUNITY

## 2025-04-18 RX ORDER — PANTOPRAZOLE 40 MG/1
TAKE 1 TABLET EVERY MORNINGON AN EMPTY STOMACH TABLET, DELAYED RELEASE ORAL
Qty: 90 TABLET | Refills: 0 | Status: ACTIVE | COMMUNITY

## 2025-04-18 RX ORDER — ESTRADIOL AND NORETHINDRONE ACETATE .5; .1 MG/1; MG/1
1 TABLET TABLET ORAL ONCE A DAY
Status: ACTIVE | COMMUNITY

## 2025-04-18 RX ORDER — VALACYCLOVIR HYDROCHLORIDE 500 MG/1
TABLET, FILM COATED ORAL
Qty: 90 TABLET | Status: ACTIVE | COMMUNITY

## 2025-04-18 RX ORDER — LOSARTAN POTASSIUM AND HYDROCHLOROTHIAZIDE 50; 12.5 MG/1; MG/1
TAKE 1 TABLET ONCE DAILY TABLET, FILM COATED ORAL
Qty: 90 EACH | Refills: 1 | Status: ACTIVE | COMMUNITY

## 2025-04-18 RX ORDER — METFORMIN HYDROCHLORIDE 500 MG/1
TAKE 1 TABLET IN THE       MORNING AND 1 TABLET IN THEEVENING WITH MEALS TABLET, FILM COATED ORAL
Qty: 180 EACH | Refills: 0 | Status: ACTIVE | COMMUNITY

## 2025-04-18 RX ORDER — LINACLOTIDE 145 UG/1
TAKE 1 CAPSULE BY MOUTH DAILY 30 MINUTES BEFORE FIRST MEAL OF THE DAY ON AN EMPTY STOMACH CAPSULE, GELATIN COATED ORAL
Qty: 90 CAPSULE | Refills: 0 | Status: ON HOLD | COMMUNITY

## 2025-04-18 RX ORDER — PANTOPRAZOLE SODIUM 40 MG/1
1 TABLET IN THE MORNING ON AN EMPTY STOMACH TABLET, DELAYED RELEASE ORAL ONCE A DAY
Qty: 90 TABLET | Refills: 3 | OUTPATIENT
Start: 2025-04-18

## 2025-04-18 NOTE — HPI-TODAY'S VISIT:
On 02/12/2024, New patient 43-year-old female with past medical history of diabetes, Hodgkin's lymphoma S/P chemo/radiation therapy, and HTN here as a post-hospital follow up.  Patient went to [place] ED 02/08/2024 for complaints of diffuse, abdominal pain, left flank pain, nausea, increased urinary frequency, and poor appetite for the past few days. Prior to that, she was seen at an urgent care and was told she had oral thrush and abdominal spasm, she was prescribed medication with no improvement. CMP, 02/08/2024 notable for potassium 3.4, creatinine 1.45 anion gap 13 and GFR 44. CBC 02/08/2024, notable for WBC 3.9, MCV 78.3. UA was positive for UTI.  CT 02/08/2024: Transverse and descending colon decompressed with somewhat shaggy wall thickening. Correlate with G.I. symptoms. Urinary bladder thickening, maybe due to decompression. Correlate with UA for cystitis. Patient was discharged with Omnicef 300 mg BID x 10 days And ibuprofen 600 mg Q6 hours X7 days. Today, patient reports loose, black bowels. No diarrhea. She also reports fecal urgency since last week. Usually she goes every other day with complete evacuation. Denies Pepto Bismol and oral iron use. Denies rectal bleeding. Denies family hx of colon cancer and IBD. She also reports epigastric and LUQ pain that is sharp and intermittent. Admits to nausea, HB and regurgitation. Denies long-term NSAID use. No known food triggers, but she reports a low tolerance to dairy. Denies vomiting. Having SOB and fatigue. No known hx of anemia but reports needing a blood transfusion in 2003 during her chemotherapy.  Today, pt is here for a follow up. She is s/p EGD/Colon. Procedure and results discussed. All questions answered. She states she has been experiencing gurgling in her stomach since Friday. She is also experiencing some abdominal bloating and abdominal cramping. She is taking Pantoprazole 40 mg at night. She is having BMs every other day. She denies incomplete evacuation. She states it is looser. Denies melena. She has seen urologist. And was told her bladder was normal. Her labs were not resulted to me yet, she states she had them done at [facility]. A medical records request was sent and they sent it back stated they do not have record of the patient visiting the facility.  7/12/24 Pt of Dr Mckeon/Denisse Seeing me due to scheduling issues Reviewed EGD and colon report from 2/2024 unremarkable except for reflux on esophagus bx.  Has been having stomach discomfort and has to run to the bathroom 'that is why they did the colonoscopy and EGD. they said they didnt see anything" Took Elavil for 90 days and says pain was better but still having episodes of urgency and having to run to the  bathroom. 3-4 times a day on a daily basis. now having nocturnal stools 1-2 ice in a week.  Stools are ALWAYS loose. and sometimes watery.  Taking Pantoprazole for reflux which helps.  10/4/24 Wants to switch MDs to me.  Says she will go many days w/o a BM and then the next 3 days "I cant stay off the toilet" [facility]  and went to the bathroom 5 times afterwards.  1/3/25 KUB showed mild fecal loading. STool studies neg Says she has increased leafy vegetables "every time I eat it has something green". If in a rush will do a green smoothie. NO changes in bowel habits.  Has been taking linzess - takes it at 5.30 am but does not have a BM daily with this. Taking  4/18/25 SAys linzess 290 did not work Amitiza 24 bid works too well - so she only takes at night and has a BM in the AM .  Happy with where Bms are.

## 2025-06-18 ENCOUNTER — ERX REFILL RESPONSE (OUTPATIENT)
Dept: URBAN - METROPOLITAN AREA CLINIC 105 | Facility: CLINIC | Age: 45
End: 2025-06-18

## 2025-06-18 RX ORDER — PANTOPRAZOLE 40 MG/1
TAKE 1 TABLET EVERY MORNINGON AN EMPTY STOMACH TABLET, DELAYED RELEASE ORAL ONCE A DAY
Qty: 90 TABLET | Refills: 2

## 2025-08-07 ENCOUNTER — TELEPHONE ENCOUNTER (OUTPATIENT)
Dept: URBAN - METROPOLITAN AREA CLINIC 105 | Facility: CLINIC | Age: 45
End: 2025-08-07

## 2025-08-07 RX ORDER — LUBIPROSTONE 24 UG/1
1 CAPSULE WITH FOOD AND WATER CAPSULE, GELATIN COATED ORAL TWICE A DAY
Qty: 180 CAPSULE | Refills: 1 | OUTPATIENT
Start: 2025-08-08 | End: 2026-02-04